# Patient Record
Sex: FEMALE | Race: WHITE | NOT HISPANIC OR LATINO
[De-identification: names, ages, dates, MRNs, and addresses within clinical notes are randomized per-mention and may not be internally consistent; named-entity substitution may affect disease eponyms.]

---

## 2017-01-06 ENCOUNTER — APPOINTMENT (OUTPATIENT)
Dept: OBGYN | Facility: CLINIC | Age: 77
End: 2017-01-06

## 2017-01-06 VITALS
DIASTOLIC BLOOD PRESSURE: 82 MMHG | HEIGHT: 65 IN | WEIGHT: 102 LBS | HEART RATE: 74 BPM | BODY MASS INDEX: 17 KG/M2 | SYSTOLIC BLOOD PRESSURE: 134 MMHG | OXYGEN SATURATION: 98 %

## 2017-01-06 DIAGNOSIS — Z01.419 ENCOUNTER FOR GYNECOLOGICAL EXAMINATION (GENERAL) (ROUTINE) W/OUT ABNORMAL FINDINGS: ICD-10-CM

## 2017-01-06 DIAGNOSIS — Z78.9 OTHER SPECIFIED HEALTH STATUS: ICD-10-CM

## 2017-01-06 DIAGNOSIS — Z82.49 FAMILY HISTORY OF ISCHEMIC HEART DISEASE AND OTHER DISEASES OF THE CIRCULATORY SYSTEM: ICD-10-CM

## 2017-01-30 ENCOUNTER — APPOINTMENT (OUTPATIENT)
Dept: RADIOLOGY | Facility: HOSPITAL | Age: 77
End: 2017-01-30

## 2017-01-30 ENCOUNTER — OUTPATIENT (OUTPATIENT)
Dept: OUTPATIENT SERVICES | Facility: HOSPITAL | Age: 77
LOS: 1 days | End: 2017-01-30
Payer: MEDICARE

## 2017-01-30 PROCEDURE — 72100 X-RAY EXAM L-S SPINE 2/3 VWS: CPT

## 2017-02-02 ENCOUNTER — APPOINTMENT (OUTPATIENT)
Dept: MRI IMAGING | Facility: HOSPITAL | Age: 77
End: 2017-02-02

## 2017-02-02 ENCOUNTER — OUTPATIENT (OUTPATIENT)
Dept: OUTPATIENT SERVICES | Facility: HOSPITAL | Age: 77
LOS: 1 days | End: 2017-02-02
Payer: MEDICARE

## 2017-02-02 PROCEDURE — 72148 MRI LUMBAR SPINE W/O DYE: CPT

## 2017-03-17 ENCOUNTER — APPOINTMENT (OUTPATIENT)
Dept: OBGYN | Facility: CLINIC | Age: 77
End: 2017-03-17

## 2017-06-28 ENCOUNTER — APPOINTMENT (OUTPATIENT)
Dept: OBGYN | Facility: CLINIC | Age: 77
End: 2017-06-28

## 2017-06-28 VITALS
RESPIRATION RATE: 16 BRPM | HEART RATE: 75 BPM | HEIGHT: 65 IN | SYSTOLIC BLOOD PRESSURE: 140 MMHG | WEIGHT: 102 LBS | DIASTOLIC BLOOD PRESSURE: 80 MMHG | BODY MASS INDEX: 17 KG/M2 | OXYGEN SATURATION: 98 %

## 2017-06-30 ENCOUNTER — RX RENEWAL (OUTPATIENT)
Age: 77
End: 2017-06-30

## 2017-06-30 DIAGNOSIS — R10.2 PELVIC AND PERINEAL PAIN: ICD-10-CM

## 2017-06-30 RX ORDER — LIDOCAINE HYDROCHLORIDE 20 MG/ML
2 JELLY TOPICAL
Qty: 1 | Refills: 3 | Status: ACTIVE | COMMUNITY
Start: 2017-06-30 | End: 1900-01-01

## 2017-08-01 ENCOUNTER — APPOINTMENT (OUTPATIENT)
Dept: OBGYN | Facility: CLINIC | Age: 77
End: 2017-08-01
Payer: MEDICARE

## 2017-08-01 VITALS
SYSTOLIC BLOOD PRESSURE: 142 MMHG | DIASTOLIC BLOOD PRESSURE: 86 MMHG | HEART RATE: 73 BPM | WEIGHT: 102 LBS | OXYGEN SATURATION: 98 % | HEIGHT: 65 IN | BODY MASS INDEX: 17 KG/M2

## 2017-08-01 DIAGNOSIS — R35.0 FREQUENCY OF MICTURITION: ICD-10-CM

## 2017-08-01 DIAGNOSIS — N36.2 URETHRAL CARUNCLE: ICD-10-CM

## 2017-08-01 DIAGNOSIS — R39.11 HESITANCY OF MICTURITION: ICD-10-CM

## 2017-08-01 PROCEDURE — 99213 OFFICE O/P EST LOW 20 MIN: CPT | Mod: 25

## 2017-08-01 PROCEDURE — A4562: CPT

## 2017-08-01 PROCEDURE — 51701 INSERT BLADDER CATHETER: CPT

## 2017-08-04 LAB — BACTERIA UR CULT: NORMAL

## 2017-11-03 ENCOUNTER — APPOINTMENT (OUTPATIENT)
Dept: OBGYN | Facility: CLINIC | Age: 77
End: 2017-11-03
Payer: MEDICARE

## 2017-11-03 VITALS
OXYGEN SATURATION: 97 % | SYSTOLIC BLOOD PRESSURE: 110 MMHG | WEIGHT: 103 LBS | HEIGHT: 65 IN | DIASTOLIC BLOOD PRESSURE: 70 MMHG | HEART RATE: 67 BPM | BODY MASS INDEX: 17.16 KG/M2 | RESPIRATION RATE: 16 BRPM

## 2017-11-03 DIAGNOSIS — N89.8 OTHER SPECIFIED NONINFLAMMATORY DISORDERS OF VAGINA: ICD-10-CM

## 2017-11-03 PROCEDURE — 57150 TREAT VAGINA INFECTION: CPT

## 2017-11-03 PROCEDURE — 99213 OFFICE O/P EST LOW 20 MIN: CPT | Mod: 25

## 2017-11-06 LAB — BACTERIA GENITAL AEROBE CULT: NORMAL

## 2018-04-23 ENCOUNTER — APPOINTMENT (OUTPATIENT)
Dept: OBGYN | Facility: CLINIC | Age: 78
End: 2018-04-23
Payer: MEDICARE

## 2018-04-23 VITALS
HEIGHT: 65 IN | BODY MASS INDEX: 17.16 KG/M2 | HEART RATE: 78 BPM | WEIGHT: 103 LBS | SYSTOLIC BLOOD PRESSURE: 134 MMHG | DIASTOLIC BLOOD PRESSURE: 80 MMHG | OXYGEN SATURATION: 97 %

## 2018-04-23 PROCEDURE — 57150 TREAT VAGINA INFECTION: CPT

## 2018-04-23 PROCEDURE — 99213 OFFICE O/P EST LOW 20 MIN: CPT | Mod: 25

## 2019-02-20 ENCOUNTER — APPOINTMENT (OUTPATIENT)
Dept: OBGYN | Facility: CLINIC | Age: 79
End: 2019-02-20
Payer: MEDICARE

## 2019-02-20 VITALS
RESPIRATION RATE: 16 BRPM | BODY MASS INDEX: 18.66 KG/M2 | DIASTOLIC BLOOD PRESSURE: 80 MMHG | WEIGHT: 112 LBS | OXYGEN SATURATION: 97 % | HEART RATE: 87 BPM | HEIGHT: 65 IN | SYSTOLIC BLOOD PRESSURE: 130 MMHG

## 2019-02-20 DIAGNOSIS — R22.1 LOCALIZED SWELLING, MASS AND LUMP, NECK: ICD-10-CM

## 2019-02-20 DIAGNOSIS — Z46.89 ENCOUNTER FOR FITTING AND ADJUSTMENT OF OTHER SPECIFIED DEVICES: ICD-10-CM

## 2019-02-20 PROCEDURE — 57150 TREAT VAGINA INFECTION: CPT

## 2019-02-20 PROCEDURE — G0101: CPT

## 2019-02-20 NOTE — PHYSICAL EXAM
[Awake] : awake [Alert] : alert [Acute Distress] : no acute distress [LAD] : lymphadenopathy [Normal Appearance] : was normal in appearance [Swelling] : was not swollen [Warmth] : was not warm [Mass (___cm)] : had a ~M  ~Vcm mass [Induration] : was not indurated [Scar] : showed no scars [Right Side] : right side of the anterior [Mass] : no breast mass [Nipple Discharge] : no nipple discharge [Axillary LAD] : no axillary lymphadenopathy [Tender] : non tender [Distended] : not distended [H/Smegaly] : no hepatosplenomegaly [Oriented x3] : oriented to person, place, and time [Depressed Mood] : not depressed [Flat Affect] : affect not flat [No Lesions] : no genitalia lesions [Vulvar Atrophy] : no vulvar atrophy [Vulvitis] : no vulvitis [Labia Majora Erythema] : no erythema of the labia majora [Labia Minora Erythema] : no erythema of the labia minora [Labia Majora] : labia major [Labia Minora] : labia minora [Normal] : clitoris [Atrophy] : atrophy [Erythema] : no erythema [Cystocele] : no cystocele [Rectocele] : no rectocele [Discharge] : no discharge [Dry Mucosa] : dry mucosa [Uterine Prolapse] : uterine prolapse [No Bleeding] : there was no active vaginal bleeding [Erosion] : had no erosions [Pap Obtained] : a Pap smear was not performed [Motion Tenderness] : there was no cervical motion tenderness [Soft] :  the cervix was soft [Normal Position] : in a normal position [Tenderness] : nontender [Enlarged ___ wks] : not enlarged [Mass ___ cm] : no uterine mass was palpated [Uterine Adnexae] : were not tender and not enlarged [Adnexa Tenderness] : were not tender [Ovarian Mass (___ Cm)] : there were no adnexal masses [FreeTextEntry9] : No prior colonoscopy

## 2019-02-20 NOTE — CHIEF COMPLAINT
[Annual Visit] : annual visit [FreeTextEntry1] : Pessary check  Doing well  Currently on Forteo for Osteporosis

## 2020-09-19 ENCOUNTER — OUTPATIENT (OUTPATIENT)
Dept: OUTPATIENT SERVICES | Facility: HOSPITAL | Age: 80
LOS: 1 days | End: 2020-09-19
Payer: MEDICARE

## 2020-09-19 ENCOUNTER — APPOINTMENT (OUTPATIENT)
Dept: RADIOLOGY | Facility: HOSPITAL | Age: 80
End: 2020-09-19
Payer: MEDICARE

## 2020-09-19 ENCOUNTER — RESULT REVIEW (OUTPATIENT)
Age: 80
End: 2020-09-19

## 2020-09-19 DIAGNOSIS — Z00.8 ENCOUNTER FOR OTHER GENERAL EXAMINATION: ICD-10-CM

## 2020-09-19 PROCEDURE — 77080 DXA BONE DENSITY AXIAL: CPT

## 2020-09-19 PROCEDURE — 77080 DXA BONE DENSITY AXIAL: CPT | Mod: 26

## 2022-01-21 ENCOUNTER — APPOINTMENT (OUTPATIENT)
Dept: CARDIOLOGY | Facility: CLINIC | Age: 82
End: 2022-01-21
Payer: MEDICARE

## 2022-01-21 ENCOUNTER — NON-APPOINTMENT (OUTPATIENT)
Age: 82
End: 2022-01-21

## 2022-01-21 VITALS
RESPIRATION RATE: 16 BRPM | OXYGEN SATURATION: 99 % | SYSTOLIC BLOOD PRESSURE: 193 MMHG | HEART RATE: 81 BPM | DIASTOLIC BLOOD PRESSURE: 105 MMHG | WEIGHT: 130 LBS | BODY MASS INDEX: 21.66 KG/M2 | HEIGHT: 65 IN | TEMPERATURE: 97.5 F

## 2022-01-21 VITALS — SYSTOLIC BLOOD PRESSURE: 204 MMHG | DIASTOLIC BLOOD PRESSURE: 92 MMHG

## 2022-01-21 VITALS — SYSTOLIC BLOOD PRESSURE: 216 MMHG | DIASTOLIC BLOOD PRESSURE: 78 MMHG

## 2022-01-21 DIAGNOSIS — Z00.00 ENCOUNTER FOR GENERAL ADULT MEDICAL EXAMINATION W/OUT ABNORMAL FINDINGS: ICD-10-CM

## 2022-01-21 DIAGNOSIS — Z82.49 FAMILY HISTORY OF ISCHEMIC HEART DISEASE AND OTHER DISEASES OF THE CIRCULATORY SYSTEM: ICD-10-CM

## 2022-01-21 PROCEDURE — 93000 ELECTROCARDIOGRAM COMPLETE: CPT

## 2022-01-21 PROCEDURE — 99204 OFFICE O/P NEW MOD 45 MIN: CPT

## 2022-01-21 NOTE — PHYSICAL EXAM
[Normal] : soft, non-tender, no masses/organomegaly, normal bowel sounds [No Edema] : no edema [No Cyanosis] : no cyanosis [No Focal Deficits] : no focal deficits [Normal Speech] : normal speech

## 2022-01-21 NOTE — DISCUSSION/SUMMARY
[Patient] : the patient [Risks] : risks [Benefits] : benefits [Alternatives] : alternatives [___ Week(s)] : in [unfilled] week(s) [FreeTextEntry1] : Discussed with patient and .  Repeat blood pressure measurements at home  Obtain Holter monitor and echo.  Questions addressed.  Lab work ordered call for results.  Initiate labetalol for blood pressure control and follow-up within next week.

## 2022-01-21 NOTE — ASSESSMENT
[FreeTextEntry1] : Atrial premature beats.  Elevated blood pressure may represent whitecoat hypertension.

## 2022-01-21 NOTE — HISTORY OF PRESENT ILLNESS
[FreeTextEntry1] : Patient seen accompanied by her .  She was at her endocrinologist office was told of possible fibrillation.  She had no palpitations and denies history of heart disease hypertension diabetes.  She is treated for osteoporosis and receives Prolia injections.  She denies history of heart murmur.  She denied chest pain dyspnea.  She indicates her pressure tends to be elevated when she goes to doctors offices but she has not been treated for hypertension.

## 2022-01-22 ENCOUNTER — LABORATORY RESULT (OUTPATIENT)
Age: 82
End: 2022-01-22

## 2022-01-24 ENCOUNTER — APPOINTMENT (OUTPATIENT)
Dept: CARDIOLOGY | Facility: CLINIC | Age: 82
End: 2022-01-24
Payer: MEDICARE

## 2022-01-24 VITALS — SYSTOLIC BLOOD PRESSURE: 156 MMHG | DIASTOLIC BLOOD PRESSURE: 80 MMHG | OXYGEN SATURATION: 99 % | HEART RATE: 76 BPM

## 2022-01-24 VITALS — SYSTOLIC BLOOD PRESSURE: 152 MMHG | DIASTOLIC BLOOD PRESSURE: 82 MMHG

## 2022-01-24 LAB
ALBUMIN SERPL ELPH-MCNC: 4.6 G/DL
ALP BLD-CCNC: 46 U/L
ALT SERPL-CCNC: 13 U/L
ANION GAP SERPL CALC-SCNC: 16 MMOL/L
AST SERPL-CCNC: 17 U/L
BASOPHILS # BLD AUTO: 0.08 K/UL
BASOPHILS NFR BLD AUTO: 1.8 %
BILIRUB SERPL-MCNC: 0.6 MG/DL
BUN SERPL-MCNC: 17 MG/DL
CALCIUM SERPL-MCNC: 9.3 MG/DL
CHLORIDE SERPL-SCNC: 101 MMOL/L
CHOLEST SERPL-MCNC: 186 MG/DL
CO2 SERPL-SCNC: 21 MMOL/L
CREAT SERPL-MCNC: 0.6 MG/DL
EOSINOPHIL # BLD AUTO: 0.2 K/UL
EOSINOPHIL NFR BLD AUTO: 4.5 %
GLUCOSE SERPL-MCNC: 99 MG/DL
HCT VFR BLD CALC: 41.2 %
HDLC SERPL-MCNC: 75 MG/DL
HGB BLD-MCNC: 13.3 G/DL
IMM GRANULOCYTES NFR BLD AUTO: 0.2 %
LDLC SERPL CALC-MCNC: 98 MG/DL
LYMPHOCYTES # BLD AUTO: 0.95 K/UL
LYMPHOCYTES NFR BLD AUTO: 21.5 %
MAGNESIUM SERPL-MCNC: 2 MG/DL
MAN DIFF?: NORMAL
MCHC RBC-ENTMCNC: 32.3 GM/DL
MCHC RBC-ENTMCNC: 33.9 PG
MCV RBC AUTO: 105.1 FL
MONOCYTES # BLD AUTO: 0.61 K/UL
MONOCYTES NFR BLD AUTO: 13.8 %
NEUTROPHILS # BLD AUTO: 2.57 K/UL
NEUTROPHILS NFR BLD AUTO: 58.2 %
NONHDLC SERPL-MCNC: 110 MG/DL
PLATELET # BLD AUTO: 204 K/UL
POTASSIUM SERPL-SCNC: 5.1 MMOL/L
PROT SERPL-MCNC: 6.2 G/DL
RBC # BLD: 3.92 M/UL
RBC # FLD: 13.2 %
SODIUM SERPL-SCNC: 138 MMOL/L
T3FREE SERPL-MCNC: 2.34 PG/ML
T4 FREE SERPL-MCNC: 1.5 NG/DL
TRIGL SERPL-MCNC: 60 MG/DL
TSH SERPL-ACNC: 4.82 UIU/ML
WBC # FLD AUTO: 4.42 K/UL

## 2022-01-24 PROCEDURE — 99214 OFFICE O/P EST MOD 30 MIN: CPT

## 2022-01-24 PROCEDURE — 93306 TTE W/DOPPLER COMPLETE: CPT

## 2022-01-24 NOTE — DISCUSSION/SUMMARY
[Patient] : the patient [Risks] : risks [Benefits] : benefits [Alternatives] : alternatives [___ Week(s)] : in [unfilled] week(s) [FreeTextEntry1] : Blood testing reviewed and discussed with patient and .  We will arrange echo.  Home blood pressure monitoring and then return visit in about 1 month review monitor and blood pressure results at that time continue labetalol.

## 2022-01-24 NOTE — HISTORY OF PRESENT ILLNESS
[FreeTextEntry1] : Follow-up visit has done well labetalol feels well, no palpitations no fatigue.  Denies chest pain lightheadedness or dizziness.  Return event monitor at this time results will be pending going forward.  Blood tests obtained and reviewed discussed with patient and .

## 2022-01-27 ENCOUNTER — NON-APPOINTMENT (OUTPATIENT)
Age: 82
End: 2022-01-27

## 2022-01-27 LAB
DOPAMINE UR-MCNC: <30 PG/ML
EPINEPH UR-MCNC: 38 PG/ML
NOREPINEPH UR-MCNC: 984 PG/ML

## 2022-01-27 PROCEDURE — 93224 XTRNL ECG REC UP TO 48 HRS: CPT

## 2022-02-28 ENCOUNTER — APPOINTMENT (OUTPATIENT)
Dept: CARDIOLOGY | Facility: CLINIC | Age: 82
End: 2022-02-28
Payer: MEDICARE

## 2022-02-28 ENCOUNTER — NON-APPOINTMENT (OUTPATIENT)
Age: 82
End: 2022-02-28

## 2022-02-28 VITALS — DIASTOLIC BLOOD PRESSURE: 84 MMHG | SYSTOLIC BLOOD PRESSURE: 170 MMHG

## 2022-02-28 VITALS — SYSTOLIC BLOOD PRESSURE: 166 MMHG | DIASTOLIC BLOOD PRESSURE: 82 MMHG

## 2022-02-28 VITALS — DIASTOLIC BLOOD PRESSURE: 84 MMHG | SYSTOLIC BLOOD PRESSURE: 172 MMHG

## 2022-02-28 VITALS
SYSTOLIC BLOOD PRESSURE: 190 MMHG | DIASTOLIC BLOOD PRESSURE: 82 MMHG | HEART RATE: 73 BPM | HEIGHT: 65 IN | WEIGHT: 161 LBS | RESPIRATION RATE: 16 BRPM | BODY MASS INDEX: 26.82 KG/M2 | OXYGEN SATURATION: 98 % | TEMPERATURE: 97.1 F

## 2022-02-28 VITALS — SYSTOLIC BLOOD PRESSURE: 164 MMHG | DIASTOLIC BLOOD PRESSURE: 84 MMHG

## 2022-02-28 PROCEDURE — 93000 ELECTROCARDIOGRAM COMPLETE: CPT

## 2022-02-28 PROCEDURE — 99214 OFFICE O/P EST MOD 30 MIN: CPT

## 2022-02-28 NOTE — HISTORY OF PRESENT ILLNESS
[FreeTextEntry1] : Follow-up visit has done well labetalol feels well, no palpitations no fatigue.  Denies chest pain lightheadedness or dizziness.  Home blood pressure readings reviewed and scanned into chart generally normal\par \par

## 2022-02-28 NOTE — CARDIOLOGY SUMMARY
[de-identified] : January 21, 2022 sinus rhythm with APCs\par \par February 28, 2022 sinus rhythm R prime V1 possible LAE

## 2022-02-28 NOTE — DISCUSSION/SUMMARY
[Patient] : the patient [Risks] : risks [Benefits] : benefits [Alternatives] : alternatives [FreeTextEntry1] : Home blood pressure monitoring shows essentially normal findings.  No significant palpitations at this time.  Elevated catecholamines noted on one value only discussed with patient and .  We will continue to monitor BP at home with labetalol.  Follow-up with her endocrinologist Dr. Luis Antonio Kovacs.  Lab results being forwarded.  Follow-up with me in 3 to 4 months or earlier as needed

## 2022-06-03 ENCOUNTER — APPOINTMENT (OUTPATIENT)
Dept: CARDIOLOGY | Facility: CLINIC | Age: 82
End: 2022-06-03
Payer: MEDICARE

## 2022-06-03 ENCOUNTER — NON-APPOINTMENT (OUTPATIENT)
Age: 82
End: 2022-06-03

## 2022-06-03 VITALS
RESPIRATION RATE: 19 BRPM | TEMPERATURE: 97.3 F | HEIGHT: 65 IN | DIASTOLIC BLOOD PRESSURE: 89 MMHG | SYSTOLIC BLOOD PRESSURE: 175 MMHG | BODY MASS INDEX: 21.99 KG/M2 | WEIGHT: 132 LBS | OXYGEN SATURATION: 98 % | HEART RATE: 81 BPM

## 2022-06-03 VITALS — DIASTOLIC BLOOD PRESSURE: 80 MMHG | SYSTOLIC BLOOD PRESSURE: 124 MMHG

## 2022-06-03 PROCEDURE — 93000 ELECTROCARDIOGRAM COMPLETE: CPT

## 2022-06-03 PROCEDURE — 99213 OFFICE O/P EST LOW 20 MIN: CPT

## 2022-06-03 NOTE — HISTORY OF PRESENT ILLNESS
[FreeTextEntry1] : Follow-up visit   Home blood pressure readings reviewed and scanned into chart generally normal\par \par No palpitations chest pain dyspnea or significant edema\par \par

## 2022-06-03 NOTE — DISCUSSION/SUMMARY
[Patient] : the patient [Risks] : risks [Benefits] : benefits [Alternatives] : alternatives [FreeTextEntry1] : Discussed with patient and .  She will continue labetalol home blood pressure monitoring low-sodium diet.  Follow with her PMD.  Questions addressed.

## 2022-10-31 ENCOUNTER — EMERGENCY (EMERGENCY)
Facility: HOSPITAL | Age: 82
LOS: 1 days | Discharge: HOME | End: 2022-10-31
Attending: EMERGENCY MEDICINE | Admitting: EMERGENCY MEDICINE
Payer: MEDICARE

## 2022-10-31 VITALS
SYSTOLIC BLOOD PRESSURE: 189 MMHG | HEIGHT: 65 IN | DIASTOLIC BLOOD PRESSURE: 94 MMHG | TEMPERATURE: 98 F | WEIGHT: 125 LBS | HEART RATE: 71 BPM | OXYGEN SATURATION: 97 % | RESPIRATION RATE: 16 BRPM

## 2022-10-31 PROCEDURE — 99283 EMERGENCY DEPT VISIT LOW MDM: CPT | Mod: 25

## 2022-10-31 PROCEDURE — 29125 APPL SHORT ARM SPLINT STATIC: CPT

## 2022-10-31 PROCEDURE — 73110 X-RAY EXAM OF WRIST: CPT

## 2022-10-31 PROCEDURE — 29125 APPL SHORT ARM SPLINT STATIC: CPT | Mod: LT

## 2022-10-31 PROCEDURE — 99283 EMERGENCY DEPT VISIT LOW MDM: CPT | Mod: FS,25

## 2022-10-31 PROCEDURE — 73110 X-RAY EXAM OF WRIST: CPT | Mod: 26,LT

## 2022-10-31 RX ORDER — ACETAMINOPHEN 500 MG
650 TABLET ORAL ONCE
Refills: 0 | Status: COMPLETED | OUTPATIENT
Start: 2022-10-31 | End: 2022-10-31

## 2022-10-31 RX ADMIN — Medication 650 MILLIGRAM(S): at 11:23

## 2022-10-31 NOTE — ED PROVIDER NOTE - PATIENT PORTAL LINK FT
You can access the FollowMyHealth Patient Portal offered by [facility] by registering at the following website: [email or web address]. By joining [facility]’s FollowMyHealth portal, you will also be able to view your health information using other applications (apps) compatible with our system.

## 2022-10-31 NOTE — ED PROVIDER NOTE - NS ED ATTENDING STATEMENT MOD
This was a shared visit with the CLARY. I reviewed and verified the documentation and independently performed the documented:

## 2022-10-31 NOTE — ED PROVIDER NOTE - NSFOLLOWUPINSTRUCTIONS_ED_ALL_ED_FT
Follow up with Orthopedist Dr. Garza: You have an appointment on Tuesday Nov 8th at [address] [place] [address].    Keep splint in place until seen by Orthopedist.    Elevate wrist as much as possible.    Ice as often as 20 minutes every 1-2 hours as needed for pain/    Tylenol or Motrin as directed for pain.    Return to ED for any concerns.      Wrist Pain, Adult      There are many things that can cause wrist pain. Some common causes include:  •An injury to the wrist.      •Using the joint too much.      •A condition that causes too much pressure to be put on a nerve in the wrist (carpal tunnel syndrome).      •Wear and tear of the joints that happens as a person gets older (osteoarthritis).      •A condition that causes swelling and stiffness in the joints (arthritis).      Sometimes, the cause of wrist pain is not known.    Often, the pain goes away when you follow your doctor's instructions for easing pain at home. This may include resting your wrist, icing your wrist, or using a splint or an elastic wrap for a short time. It is important to tell your doctor if your wrist pain does not go away.      Follow these instructions at home:    If you have a splint or elastic wrap:     •Wear the splint or wrap as told by your doctor. Take it off only as told by your doctor. Ask if you can take it off for bathing.    •Loosen the splint or wrap if your fingers:  •Tingle.      •Become numb.      •Turn cold and blue.        •Check the skin around the splint or wrap every day. Tell your doctor about any concerns.      •Keep the splint or wrap clean.    •If the splint or wrap is not waterproof:  •Do not let it get wet.      •Cover it with a watertight covering when you take a bath or shower.          Managing pain, stiffness, and swelling    •If told, put ice on the painful area. To do this:  •If you have a removable splint or wrap, take it off as told by your doctor.      •Put ice in a plastic bag.      •Place a towel between your skin and the bag or between your splint or wrap and the bag.      •Leave the ice on for 20 minutes, 2–3 times a day.        •Move your fingers often.      •Raise (elevate) the injured area above the level of your heart while you are sitting or lying down.      Activity     •Rest your wrist as told by your doctor.      •Return to your normal activities as told by your doctor. Ask your doctor what activities are safe for you.      •Ask your doctor when it is safe to drive if you have a splint or wrap on your wrist.      •Do exercises as told by your doctor.      General instructions     •Pay attention to any changes in your symptoms.      •Take over-the-counter and prescription medicines only as told by your doctor.      •Keep all follow-up visits as told by your doctor. This is important.        Contact a doctor if:    •You have a sudden, sharp pain in the wrist, hand, or arm that is different or new.      •The swelling or bruising on your wrist or hand gets worse.    •Your skin:  •Becomes red.      •Gets a rash.      •Has open sores.        •Your pain does not get better.      •Your pain gets worse.      •You have a fever or chills.        Get help right away if:    •You lose feeling in your fingers or hand.      •Your fingers turn white, very red, or cold and blue.      •You cannot move your fingers.        Summary    •There are many things that can cause wrist pain.      •It is important to tell your doctor if your wrist pain does not go away.      •You may need to wear a splint or a wrap for a short period of time.      •Return to your normal activities as told by your doctor. Ask your doctor what activities are safe for you.      This information is not intended to replace advice given to you by your health care provider. Make sure you discuss any questions you have with your health care provider.

## 2022-10-31 NOTE — ED PROVIDER NOTE - CARE PROVIDER_API CALL
Raimundo Garza)  Orthopaedic Surgery  [address]  [place] [address]  Phone: [phone]  Fax: [phone]  Follow Up Time:

## 2022-10-31 NOTE — ED PROVIDER NOTE - ATTENDING APP SHARED VISIT CONTRIBUTION OF CARE
Kristian with EARNESTINE Rhodes. 81 yo female with a medical history including Osteoporosis, complaining of left wrist pain that started this morning, right-hand dominant, no falls, injuries or trauma, denies any numbness, tingling or weakness.     + anatomical snuffbox tenderness on examination, no deformity or instability, neurovascularly intact.    Xray, tylenol, splint, ortho follow up    No obvious fracture on xray, thumb spica splint placed, explained to patient and family concern for non-visualized fracture and need to keep splint in place.  Orthopedic follow up scheduled by Social Work.    I performed a face to face bedside interview with patient regarding history of present illness, review of symptoms and past medical history. I completed an independent physical exam.  I have discussed the patient's plan of care with Physician Assistant (PA). I agree with note as stated above, having amended the EMR as needed to reflect my findings.   This includes History of Present Illness, HIV, Past Medical/Surgical/Family/Social History, Allergies and Home Medications, Review of Systems, Physical Exam, and any Progress Notes during the time I functioned as the attending physician for this patient.

## 2022-10-31 NOTE — ED PROCEDURE NOTE - CPROC ED POST PROC CARE GUIDE1
Follow up with an Orthopedist scheduled/Verbal/written post procedure instructions were given to patient/caregiver./Elevate the injured extremity as instructed./Keep the cast/splint/dressing clean and dry.

## 2022-10-31 NOTE — ED ADULT TRIAGE NOTE - BP NONINVASIVE DIASTOLIC (MM HG)
Case Management Discharge Note      Final Note: Discharged home. Christina Smith, RED              Transportation Services  Private: Car    Final Discharge Disposition Code: 01 - home or self-care   94

## 2022-10-31 NOTE — ED PROVIDER NOTE - OBJECTIVE STATEMENT
83 yo female with a medical history including Osteoporosis, complaining of left wrist pain that started this morning, right-hand dominant, no falls, injuries or trauma, denies any numbness, tingling or weakness.

## 2022-10-31 NOTE — CHART NOTE - NSCHARTNOTEFT_GEN_A_CORE
SW met with patient at bedside to assess for social work needs and to complete home assessment of safety.  Patient is a 82 year old female presenting to ED with wrist pain.  Patients spouse and son at bedside.  Patient denies any safety concerns at home, reports she has family support to assist as needed.  No recent falls reported. As per PA, patient is to follow up with orthopedist.  SW offered assistance with scheduling.  SW called [facility] at [phone] and scheduled follow up for 11/8/22 at [address] [place] [address] with Dr Garza at 10:30 AM.  Patient/spouse provided with appointment reminder card.  No SW needs identified at this time.  Patient is ISAR negative.  SW to remain available as needed.

## 2022-10-31 NOTE — ED PROVIDER NOTE - CLINICAL SUMMARY MEDICAL DECISION MAKING FREE TEXT BOX
left wrist pain, no known trauma,    Osteoporosis    RH dominant    + anatomical snuffbox tenderness    Xray, tylenol, splint, ortho follow up left wrist pain, no known trauma,    Osteoporosis    RH dominant    + anatomical snuffbox tenderness    Xray, tylenol, splint, ortho follow up    No obvious fracture on xray, thumb spica splint placed, explained to patient and family concern for non-visualized fracture and need to keep splint in place.  Orthopedic follow up scheduled by Social Work. 81 yo female with a medical history including Osteoporosis, complaining of left wrist pain that started this morning, right-hand dominant, no falls, injuries or trauma, denies any numbness, tingling or weakness.     + anatomical snuffbox tenderness on examination, no deformity or instability, neurovascularly intact.    Xray, tylenol, splint, ortho follow up    No obvious fracture on xray, thumb spica splint placed, explained to patient and family concern for non-visualized fracture and need to keep splint in place.  Orthopedic follow up scheduled by Social Work.

## 2022-11-08 ENCOUNTER — APPOINTMENT (OUTPATIENT)
Dept: ORTHOPEDIC SURGERY | Facility: CLINIC | Age: 82
End: 2022-11-08

## 2022-11-08 VITALS — HEIGHT: 65 IN | WEIGHT: 125 LBS | BODY MASS INDEX: 20.83 KG/M2

## 2022-11-08 DIAGNOSIS — M19.049 PRIMARY OSTEOARTHRITIS, UNSPECIFIED HAND: ICD-10-CM

## 2022-11-08 PROCEDURE — 99203 OFFICE O/P NEW LOW 30 MIN: CPT

## 2022-11-08 NOTE — HISTORY OF PRESENT ILLNESS
[de-identified] : Patient is a RHD 82 year old female who presents with complaints of left wrist/hand pain which began on 10/31/22. She states insidiously she began to feel pain the night of the 31st and the next day, the pain progressed quite a bit and is severe. She believes wring out a rag, may have been the cause of her pain but is not entreily sure. She takes Ibuprofen for pain relief.

## 2022-11-08 NOTE — END OF VISIT
[FreeTextEntry3] : All medical record entries made by the Scribe were at my,  Dr. Raimundo Garza MD., direction and personally dictated by me on 11/08/2022. I have personally reviewed the chart and agree that the record accurately reflects my personal performance of the history, physical exam, assessment and plan.

## 2022-11-08 NOTE — DISCUSSION/SUMMARY
[de-identified] : The underlying pathophysiology was reviewed with the patient. XR films were reviewed with the patient. Discussed at length the nature of the patient’s condition. 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 I told her that she most likely aggravated already existing CMC joint arthritis. Given her symptoms are improving, I recommended symptomatic treatment with Ibuprofen and topical modalities. If her symptoms persist or worsen, she will return to the office for a possible cortisone injection as spoken about at today's visit.\par \par All questions answered, understanding verbalized. Patient in agreement with plan of care. Follow up in 4 weeks for reassessment, if needed.

## 2022-11-08 NOTE — ADDENDUM
[FreeTextEntry1] : I, Joya Darnell wrote this note acting as a scribe for Dr. Raimundo Garza on Nov 08, 2022.

## 2022-11-08 NOTE — PHYSICAL EXAM
[de-identified] : Patient is WDWN, alert, and in no acute distress. Breathing is unlabored. She is grossly oriented to person, place, and time.\par \par She is accompanied by her  today.\par \par Left Wrist/Hand:\par She presents in a fiber glass thumb spica splint, which was removed for physical exam.\par Once the splint was removed, there is no evidence of skin breakdown or lesions.\par Basal joint tenderness noted.\par Positive grind test. \par She is able to actively flex and extend the wrist.\par She has full digital motion.\par Full sensation intact to the digits. [de-identified] : EXAM: XR WRIST COMP MIN 3 VIEWS LT\par PROCEDURE DATE: 10/31/2022\par IMPRESSION:\par Three views of the left wrist without comparison demonstrate mild degenerative change with no fracture, dislocation or radiographic soft tissue abnormality.\par \par SARAH DEY DO; Attending Radiologist\par This document has

## 2022-11-15 PROBLEM — M81.0 AGE-RELATED OSTEOPOROSIS WITHOUT CURRENT PATHOLOGICAL FRACTURE: Chronic | Status: ACTIVE | Noted: 2022-11-02

## 2022-11-16 ENCOUNTER — OUTPATIENT (OUTPATIENT)
Dept: OUTPATIENT SERVICES | Facility: HOSPITAL | Age: 82
LOS: 1 days | End: 2022-11-16
Payer: MEDICARE

## 2022-11-16 ENCOUNTER — APPOINTMENT (OUTPATIENT)
Dept: RADIOLOGY | Facility: HOSPITAL | Age: 82
End: 2022-11-16

## 2022-11-16 DIAGNOSIS — Z00.8 ENCOUNTER FOR OTHER GENERAL EXAMINATION: ICD-10-CM

## 2022-11-16 PROCEDURE — 77080 DXA BONE DENSITY AXIAL: CPT

## 2022-11-16 PROCEDURE — 77080 DXA BONE DENSITY AXIAL: CPT | Mod: 26

## 2022-12-01 ENCOUNTER — NON-APPOINTMENT (OUTPATIENT)
Age: 82
End: 2022-12-01

## 2022-12-01 ENCOUNTER — APPOINTMENT (OUTPATIENT)
Dept: CARDIOLOGY | Facility: CLINIC | Age: 82
End: 2022-12-01

## 2022-12-01 VITALS
DIASTOLIC BLOOD PRESSURE: 79 MMHG | RESPIRATION RATE: 16 BRPM | HEIGHT: 65 IN | OXYGEN SATURATION: 97 % | BODY MASS INDEX: 21.83 KG/M2 | SYSTOLIC BLOOD PRESSURE: 162 MMHG | WEIGHT: 131 LBS | TEMPERATURE: 97.7 F | HEART RATE: 83 BPM

## 2022-12-01 PROCEDURE — 99214 OFFICE O/P EST MOD 30 MIN: CPT

## 2022-12-01 PROCEDURE — 93000 ELECTROCARDIOGRAM COMPLETE: CPT

## 2022-12-01 NOTE — DISCUSSION/SUMMARY
[Patient] : the patient [Risks] : risks [Benefits] : benefits [Alternatives] : alternatives [___ Month(s)] : in [unfilled] month(s) [FreeTextEntry1] : Discussed with patient and .  She will continue labetalol home blood pressure monitoring low-sodium diet.  Follow with her PMD.  Questions addressed.  [EKG obtained to assist in diagnosis and management of assessed problem(s)] : EKG obtained to assist in diagnosis and management of assessed problem(s)

## 2022-12-01 NOTE — CARDIOLOGY SUMMARY
[de-identified] : January 21, 2022 sinus rhythm with APCs\par \par February 28, 2022 sinus rhythm R prime V1 possible LAE\par \par 12 1 22 sinus [de-identified] : Jan 22 normal lvef

## 2022-12-01 NOTE — PHYSICAL EXAM
[Normal S1, S2] : normal S1, S2 [No Gallop] : no gallop [Normal] : soft, non-tender, no masses/organomegaly, normal bowel sounds [No Edema] : no edema [No Cyanosis] : no cyanosis [No Focal Deficits] : no focal deficits [Normal Speech] : normal speech

## 2023-06-01 ENCOUNTER — APPOINTMENT (OUTPATIENT)
Dept: CARDIOLOGY | Facility: CLINIC | Age: 83
End: 2023-06-01
Payer: MEDICARE

## 2023-06-01 ENCOUNTER — NON-APPOINTMENT (OUTPATIENT)
Age: 83
End: 2023-06-01

## 2023-06-01 VITALS
WEIGHT: 131 LBS | SYSTOLIC BLOOD PRESSURE: 144 MMHG | HEART RATE: 84 BPM | HEIGHT: 65 IN | DIASTOLIC BLOOD PRESSURE: 84 MMHG | BODY MASS INDEX: 21.83 KG/M2 | OXYGEN SATURATION: 97 %

## 2023-06-01 VITALS — DIASTOLIC BLOOD PRESSURE: 82 MMHG | SYSTOLIC BLOOD PRESSURE: 138 MMHG

## 2023-06-01 DIAGNOSIS — I49.1 ATRIAL PREMATURE DEPOLARIZATION: ICD-10-CM

## 2023-06-01 PROCEDURE — 93000 ELECTROCARDIOGRAM COMPLETE: CPT

## 2023-06-01 PROCEDURE — 99213 OFFICE O/P EST LOW 20 MIN: CPT

## 2023-06-01 RX ORDER — DENOSUMAB 60 MG/ML
INJECTION SUBCUTANEOUS
Refills: 0 | Status: DISCONTINUED | COMMUNITY
End: 2023-06-01

## 2023-06-01 NOTE — DISCUSSION/SUMMARY
[Patient] : the patient [Risks] : risks [Benefits] : benefits [Alternatives] : alternatives [___ Month(s)] : in [unfilled] month(s) [FreeTextEntry1] : Discussed with patient and .  She will continue labetalol and continue to monitor home blood pressure, follow low-sodium diet.  Follow with her PMD.  Questions addressed. \par \par  [EKG obtained to assist in diagnosis and management of assessed problem(s)] : EKG obtained to assist in diagnosis and management of assessed problem(s)

## 2023-06-01 NOTE — HISTORY OF PRESENT ILLNESS
[FreeTextEntry1] : Follow-up visit   Home blood pressure readings reviewed and scanned into chart goal of 116/72 high 132/76.  She overall feels well.  She tries to be active physically up and down stairs.\par \par No palpitations chest pain dyspnea or significant edema\par \par

## 2023-06-01 NOTE — CARDIOLOGY SUMMARY
[de-identified] : January 21, 2022 sinus rhythm with APCs\par \par February 28, 2022 sinus rhythm R prime V1 possible LAE\par \par June 1, 2023 sinus rhythm with APC\par \par 12 1 22 sinus [de-identified] : Jan 22 normal lvef

## 2023-06-01 NOTE — REASON FOR VISIT
[Arrhythmia/ECG Abnorrmalities] : arrhythmia/ECG abnormalities [Hypertension] : hypertension [Spouse] : spouse [FreeTextEntry3] : [place]

## 2023-12-14 ENCOUNTER — NON-APPOINTMENT (OUTPATIENT)
Age: 83
End: 2023-12-14

## 2023-12-14 ENCOUNTER — APPOINTMENT (OUTPATIENT)
Dept: CARDIOLOGY | Facility: CLINIC | Age: 83
End: 2023-12-14
Payer: MEDICARE

## 2023-12-14 VITALS
HEART RATE: 78 BPM | DIASTOLIC BLOOD PRESSURE: 74 MMHG | HEIGHT: 60 IN | SYSTOLIC BLOOD PRESSURE: 174 MMHG | TEMPERATURE: 95.6 F | RESPIRATION RATE: 19 BRPM | OXYGEN SATURATION: 97 % | WEIGHT: 293 LBS | BODY MASS INDEX: 57.52 KG/M2

## 2023-12-14 VITALS — SYSTOLIC BLOOD PRESSURE: 156 MMHG | DIASTOLIC BLOOD PRESSURE: 92 MMHG

## 2023-12-14 DIAGNOSIS — R03.0 ELEVATED BLOOD-PRESSURE READING, W/OUT DIAGNOSIS OF HYPERTENSION: ICD-10-CM

## 2023-12-14 PROCEDURE — 93000 ELECTROCARDIOGRAM COMPLETE: CPT

## 2023-12-14 PROCEDURE — 99214 OFFICE O/P EST MOD 30 MIN: CPT

## 2023-12-14 NOTE — HISTORY OF PRESENT ILLNESS
[FreeTextEntry1] : Feels very well no chest pain dyspnea palpitations.  Not checking blood pressures at home

## 2023-12-14 NOTE — PHYSICAL EXAM
[Normal S1, S2] : normal S1, S2 [No Gallop] : no gallop [Normal] : soft, non-tender, no masses/organomegaly, normal bowel sounds [No Edema] : no edema [No Cyanosis] : no cyanosis [No Focal Deficits] : no focal deficits [Normal Speech] : normal speech [de-identified] : Rash right lower extremity

## 2023-12-14 NOTE — DISCUSSION/SUMMARY
[Patient] : the patient [Risks] : risks [Benefits] : benefits [Alternatives] : alternatives [___ Month(s)] : in [unfilled] month(s) [FreeTextEntry1] : Discussed with patient and .  She will continue labetalol but increase dosing to 3 times daily and resume monitor home blood pressure, follow low-sodium diet.  Follow with her PMD.  Questions addressed.    [EKG obtained to assist in diagnosis and management of assessed problem(s)] : EKG obtained to assist in diagnosis and management of assessed problem(s)

## 2023-12-14 NOTE — CARDIOLOGY SUMMARY
[de-identified] : January 21, 2022 sinus rhythm with APCs  February 28, 2022 sinus rhythm R prime V1 possible LAE  December 14, 2023 sinus rhythm with APC  June 1, 2023 sinus rhythm with APC  12 1 22 sinus [de-identified] : Jan 22 normal lvef

## 2023-12-30 ENCOUNTER — NON-APPOINTMENT (OUTPATIENT)
Age: 83
End: 2023-12-30

## 2023-12-31 RX ORDER — LABETALOL HYDROCHLORIDE 100 MG/1
100 TABLET, FILM COATED ORAL
Qty: 180 | Refills: 3 | Status: ACTIVE | COMMUNITY
Start: 2023-12-31 | End: 1900-01-01

## 2024-02-01 LAB
ALBUMIN SERPL ELPH-MCNC: 4.4 G/DL
ALP BLD-CCNC: 56 U/L
ALT SERPL-CCNC: 11 U/L
ANION GAP SERPL CALC-SCNC: 8 MMOL/L
AST SERPL-CCNC: 16 U/L
BILIRUB SERPL-MCNC: 0.6 MG/DL
BUN SERPL-MCNC: 14 MG/DL
CALCIUM SERPL-MCNC: 9.2 MG/DL
CHLORIDE SERPL-SCNC: 105 MMOL/L
CHOLEST SERPL-MCNC: 175 MG/DL
CO2 SERPL-SCNC: 27 MMOL/L
CREAT SERPL-MCNC: 0.59 MG/DL
EGFR: 89 ML/MIN/1.73M2
GLUCOSE SERPL-MCNC: 103 MG/DL
HDLC SERPL-MCNC: 74 MG/DL
LDLC SERPL CALC-MCNC: 92 MG/DL
NONHDLC SERPL-MCNC: 101 MG/DL
POTASSIUM SERPL-SCNC: 4.1 MMOL/L
PROT SERPL-MCNC: 6.1 G/DL
SODIUM SERPL-SCNC: 140 MMOL/L
TRIGL SERPL-MCNC: 45 MG/DL
TSH SERPL-ACNC: 3.4 UIU/ML

## 2024-02-06 LAB
DOPAMINE UR-MCNC: <30 PG/ML
EPINEPH UR-MCNC: 19 PG/ML
NOREPINEPH UR-MCNC: 389 PG/ML

## 2024-02-15 ENCOUNTER — NON-APPOINTMENT (OUTPATIENT)
Age: 84
End: 2024-02-15

## 2024-02-15 ENCOUNTER — APPOINTMENT (OUTPATIENT)
Dept: CARDIOLOGY | Facility: CLINIC | Age: 84
End: 2024-02-15
Payer: MEDICARE

## 2024-02-15 VITALS — SYSTOLIC BLOOD PRESSURE: 132 MMHG | DIASTOLIC BLOOD PRESSURE: 66 MMHG

## 2024-02-15 VITALS
TEMPERATURE: 92.1 F | HEART RATE: 86 BPM | WEIGHT: 129 LBS | RESPIRATION RATE: 20 BRPM | DIASTOLIC BLOOD PRESSURE: 74 MMHG | BODY MASS INDEX: 25.32 KG/M2 | SYSTOLIC BLOOD PRESSURE: 156 MMHG | HEIGHT: 60 IN | OXYGEN SATURATION: 96 %

## 2024-02-15 DIAGNOSIS — I10 ESSENTIAL (PRIMARY) HYPERTENSION: ICD-10-CM

## 2024-02-15 PROCEDURE — 93000 ELECTROCARDIOGRAM COMPLETE: CPT

## 2024-02-15 PROCEDURE — 99214 OFFICE O/P EST MOD 30 MIN: CPT

## 2024-02-15 NOTE — PHYSICAL EXAM
[Normal S1, S2] : normal S1, S2 [No Gallop] : no gallop [Normal] : clear lung fields, good air entry, no respiratory distress [No Edema] : no edema [No Cyanosis] : no cyanosis [No Focal Deficits] : no focal deficits [Normal Speech] : normal speech [de-identified] : Rash right lower extremity

## 2024-02-15 NOTE — DISCUSSION/SUMMARY
[Patient] : the patient [Risks] : risks [Benefits] : benefits [Alternatives] : alternatives [___ Month(s)] : in [unfilled] month(s) [FreeTextEntry1] : Discussed with patient and .  She will continue labetalol 3 times daily and resume monitor home blood pressure, follow low-sodium diet.  Follow with her PMD.  Questions addressed.    [EKG obtained to assist in diagnosis and management of assessed problem(s)] : EKG obtained to assist in diagnosis and management of assessed problem(s)

## 2024-02-15 NOTE — HISTORY OF PRESENT ILLNESS
[FreeTextEntry1] : Feels very well no chest pain dyspnea palpitations.  Not checking blood pressures at home data reviewed generally quite normal pressures in the 1 teens sometimes 120s

## 2024-02-15 NOTE — CARDIOLOGY SUMMARY
[de-identified] : January 21, 2022 sinus rhythm with APCs  February 28, 2022 sinus rhythm R prime V1 possible LAE February 15, 2024 sinus rhythm R prime V1 left axis  December 14, 2023 sinus rhythm with APC  June 1, 2023 sinus rhythm with APC  12 1 22 sinus [de-identified] : Jan 22 normal lvef

## 2024-02-19 NOTE — CARDIOLOGY SUMMARY
[de-identified] : January 21, 2022 sinus rhythm with APCs\par \par February 28, 2022 sinus rhythm R prime V1 possible LAE
No

## 2024-08-09 ENCOUNTER — APPOINTMENT (OUTPATIENT)
Dept: CARDIOLOGY | Facility: CLINIC | Age: 84
End: 2024-08-09

## 2024-08-09 ENCOUNTER — NON-APPOINTMENT (OUTPATIENT)
Age: 84
End: 2024-08-09

## 2024-08-09 PROCEDURE — G2211 COMPLEX E/M VISIT ADD ON: CPT

## 2024-08-09 PROCEDURE — 93000 ELECTROCARDIOGRAM COMPLETE: CPT

## 2024-08-09 PROCEDURE — 99214 OFFICE O/P EST MOD 30 MIN: CPT

## 2024-08-09 NOTE — ASSESSMENT
[FreeTextEntry1] : Hypertension reasonably well-controlled on current regimen of labetalol 100 mg 3 times daily.  APCs asymptomatic

## 2024-08-09 NOTE — PHYSICAL EXAM
[Normal S1, S2] : normal S1, S2 [No Gallop] : no gallop [Normal] : clear lung fields, good air entry, no respiratory distress [No Edema] : no edema [No Cyanosis] : no cyanosis [No Focal Deficits] : no focal deficits [Normal Speech] : normal speech [de-identified] : Rash right lower extremity

## 2024-08-09 NOTE — HISTORY OF PRESENT ILLNESS
[FreeTextEntry1] : Feels very well no chest pain dyspnea palpitations.  Home blood pressure measurements results being scanned into chart typically in the low 100s and 1 teens over 60s sometimes 120 rarely 140s to 150s.

## 2024-08-09 NOTE — DISCUSSION/SUMMARY
[Patient] : the patient [Risks] : risks [Benefits] : benefits [Alternatives] : alternatives [___ Month(s)] : in [unfilled] month(s) [FreeTextEntry1] : Discussed with patient and .  She will continue labetalol 3 times daily and  monitor home blood pressure, follow low-sodium diet.  Follow with her PMD.  Questions addressed.    [EKG obtained to assist in diagnosis and management of assessed problem(s)] : EKG obtained to assist in diagnosis and management of assessed problem(s)

## 2024-08-09 NOTE — CARDIOLOGY SUMMARY
[de-identified] : January 21, 2022 sinus rhythm with APCs  February 28, 2022 sinus rhythm R prime V1 possible LAE February 15, 2024 sinus rhythm R prime V1 left axis August 9, 2024 sinus rhythm December 14, 2023 sinus rhythm with APC  June 1, 2023 sinus rhythm with APC  12 1 22 sinus [de-identified] : Jan 22 normal lvef

## 2025-03-10 ENCOUNTER — NON-APPOINTMENT (OUTPATIENT)
Age: 85
End: 2025-03-10

## 2025-03-10 ENCOUNTER — APPOINTMENT (OUTPATIENT)
Dept: CARDIOLOGY | Facility: CLINIC | Age: 85
End: 2025-03-10
Payer: MEDICARE

## 2025-03-10 VITALS
DIASTOLIC BLOOD PRESSURE: 85 MMHG | WEIGHT: 121 LBS | HEIGHT: 60 IN | HEART RATE: 65 BPM | BODY MASS INDEX: 23.75 KG/M2 | OXYGEN SATURATION: 97 % | SYSTOLIC BLOOD PRESSURE: 149 MMHG

## 2025-03-10 DIAGNOSIS — I49.1 ATRIAL PREMATURE DEPOLARIZATION: ICD-10-CM

## 2025-03-10 DIAGNOSIS — Z00.00 ENCOUNTER FOR GENERAL ADULT MEDICAL EXAMINATION W/OUT ABNORMAL FINDINGS: ICD-10-CM

## 2025-03-10 DIAGNOSIS — I10 ESSENTIAL (PRIMARY) HYPERTENSION: ICD-10-CM

## 2025-03-10 PROCEDURE — G2211 COMPLEX E/M VISIT ADD ON: CPT

## 2025-03-10 PROCEDURE — 99214 OFFICE O/P EST MOD 30 MIN: CPT

## 2025-03-10 PROCEDURE — 93000 ELECTROCARDIOGRAM COMPLETE: CPT

## 2025-07-02 ENCOUNTER — APPOINTMENT (OUTPATIENT)
Dept: PULMONOLOGY | Facility: CLINIC | Age: 85
End: 2025-07-02
Payer: MEDICARE

## 2025-07-02 VITALS
BODY MASS INDEX: 21.99 KG/M2 | OXYGEN SATURATION: 98 % | SYSTOLIC BLOOD PRESSURE: 142 MMHG | HEART RATE: 68 BPM | WEIGHT: 112 LBS | RESPIRATION RATE: 20 BRPM | TEMPERATURE: 97.5 F | HEIGHT: 60 IN | DIASTOLIC BLOOD PRESSURE: 78 MMHG

## 2025-07-02 PROBLEM — H61.23 BILATERAL IMPACTED CERUMEN: Status: ACTIVE | Noted: 2025-07-02

## 2025-07-02 PROCEDURE — 69210 REMOVE IMPACTED EAR WAX UNI: CPT

## 2025-07-02 PROCEDURE — 99213 OFFICE O/P EST LOW 20 MIN: CPT | Mod: 25

## 2025-07-02 PROCEDURE — 99203 OFFICE O/P NEW LOW 30 MIN: CPT | Mod: 25

## 2025-09-15 ENCOUNTER — NON-APPOINTMENT (OUTPATIENT)
Age: 85
End: 2025-09-15